# Patient Record
Sex: FEMALE | URBAN - NONMETROPOLITAN AREA
[De-identification: names, ages, dates, MRNs, and addresses within clinical notes are randomized per-mention and may not be internally consistent; named-entity substitution may affect disease eponyms.]

---

## 2020-09-02 ENCOUNTER — APPOINTMENT (RX ONLY)
Dept: URBAN - NONMETROPOLITAN AREA CLINIC 13 | Facility: CLINIC | Age: 70
Setting detail: DERMATOLOGY
End: 2020-09-02

## 2020-09-02 DIAGNOSIS — Z41.9 ENCOUNTER FOR PROCEDURE FOR PURPOSES OTHER THAN REMEDYING HEALTH STATE, UNSPECIFIED: ICD-10-CM

## 2020-09-02 PROCEDURE — ? JUVEDERM VOLUMA XC INJECTION

## 2020-09-02 PROCEDURE — ? RESTYLANE KYSSE INJECTION

## 2020-09-02 NOTE — PROCEDURE: JUVEDERM VOLUMA XC INJECTION
Additional Area 1 Volume In Cc: 0
Anesthesia Volume In Cc: 0.5
Post-Care Instructions: Patient instructed to apply ice to reduce swelling.
Use Map Statement For Sites (Optional): No
Number Of Syringes (Required For Inventory): 1
Additional Area 2 Location: jawline
Cheeks Filler Volume In Cc: 3
Lot #: [id number]
Map Statment: See Attach Map for Complete Details
Price (Use Numbers Only, No Special Characters Or $): 5572
Filler: Juvederm Voluma XC
Procedural Text: The filler was administered to the treatment areas noted above. Injected into cheek area.\\n\\nTouch up to slight uneven cheeks.
Additional Area 1 Location: lateral mid face lift
Additional Area 3 Location: Left cheek touch up
Detail Level: Detailed
Expiration Date (Month Year): 8/21
Consent: Written consent obtained. Risks include but not limited to bruising, beading, irregular texture, ulceration, infection, allergic reaction, scar formation, incomplete augmentation, temporary nature, procedural pain.

## 2020-09-02 NOTE — PROCEDURE: RESTYLANE KYSSE INJECTION
Cheeks Filler Volume In Cc: 0
Additional Anesthesia Volume In Cc: 6
Anesthesia Volume In Cc: 0.5
Map Statement: See Attach Map for Complete Details
Number Of Syringes (Required For Inventory): 1
Lot #: [id number]
Procedural Text: The filler was administered to the treatment areas noted above.
Price (Use Numbers Only, No Special Characters Or $): 181
Additional Area 2 Location: perioral rhytides
Additional Area 2 Volume In Cc: 0.3
Detail Level: Detailed
Consent: Written consent obtained. Risks include but not limited to bruising, beading, irregular texture, ulceration, infection, allergic reaction, scar formation, incomplete augmentation, temporary nature, procedural pain.
Include Cannula Information In Note?: No
Filler: Restylane Kysse
Anesthesia Type: 1% lidocaine with epinephrine
Expiration Date (Month Year): 5/22
Vermilion Lips Filler Volume In Cc: 0.7
Additional Area 1 Location: upper vermillion, perioral rhytides
Post-Care Instructions: Patient instructed to apply ice to reduce swelling.

## 2020-09-03 ENCOUNTER — APPOINTMENT (RX ONLY)
Dept: URBAN - NONMETROPOLITAN AREA CLINIC 13 | Facility: CLINIC | Age: 70
Setting detail: DERMATOLOGY
End: 2020-09-03

## 2020-09-03 DIAGNOSIS — L81.4 OTHER MELANIN HYPERPIGMENTATION: ICD-10-CM

## 2020-09-03 PROCEDURE — ? Q-SWITCHED LASER

## 2020-09-03 ASSESSMENT — LOCATION SIMPLE DESCRIPTION DERM
LOCATION SIMPLE: LEFT FOREHEAD
LOCATION SIMPLE: RIGHT CHEEK
LOCATION SIMPLE: LEFT CHEEK

## 2020-09-03 ASSESSMENT — LOCATION ZONE DERM: LOCATION ZONE: FACE

## 2020-09-03 ASSESSMENT — LOCATION DETAILED DESCRIPTION DERM
LOCATION DETAILED: RIGHT CENTRAL MALAR CHEEK
LOCATION DETAILED: LEFT INFERIOR MEDIAL FOREHEAD
LOCATION DETAILED: LEFT LATERAL MALAR CHEEK

## 2020-09-03 NOTE — PROCEDURE: Q-SWITCHED LASER
Treatment Number: 1
Area In Cm^2: 0
Fluence: 0.6
Spot Size In Mm: 8
Endpoint: Immediate endpoint erythema. Post care reviewed with patient.
Spot Size In Mm: 2
Number Of Pulses: 2 passes
Consent: Written consent obtained, risks reviewed including but not limited to crusting, scabbing, blistering, scarring, darker or lighter pigmentary change, systemic reactions, ulceration, incidental hair removal, bruising, and/or incomplete removal.
Fluence: 7.5
Fluence: 1.6
Frequency In Hz: 5
Detail Level: Detailed
Post-Care Instructions: I reviewed with the patient in detail post-care instructions. Patient should avoid sunlight and wear sun protection.
Spot Size In Mm: 3
Frequency In Hz: 10
Price (Use Numbers Only, No Special Characters Or $): 400
Anesthesia Type: 1% lidocaine with epinephrine
Fluence: 4.5

## 2021-06-30 ENCOUNTER — APPOINTMENT (RX ONLY)
Dept: URBAN - NONMETROPOLITAN AREA CLINIC 13 | Facility: CLINIC | Age: 71
Setting detail: DERMATOLOGY
End: 2021-06-30

## 2021-06-30 DIAGNOSIS — Z41.9 ENCOUNTER FOR PROCEDURE FOR PURPOSES OTHER THAN REMEDYING HEALTH STATE, UNSPECIFIED: ICD-10-CM

## 2021-06-30 PROCEDURE — ? JUVEDERM VOLUMA XC INJECTION

## 2021-06-30 PROCEDURE — ? BOTOX

## 2021-06-30 PROCEDURE — ? RESTYLANE KYSSE INJECTION

## 2021-06-30 NOTE — PROCEDURE: BOTOX
Additional Area 2 Units: 0
Additional Area 1 Location: brow 3u R 2u L
Price (Use Numbers Only, No Special Characters Or $): 931
Dilution (U/0.1 Cc): 1
Show Right And Left Pupillary Line Units: No
Additional Area 6 Location: R side of forehead
Glabellar Complex Units: 25
Show Periorbital Units: Yes
Additional Area 3 Location: Right lateral eye
Detail Level: Zone
Additional Area 5 Location: forhead R side 1 unit.
Inferior Lateral Orbicularis Oculi Units: 15
Consent: Written consent obtained. Risks include but not limited to lid/brow ptosis, bruising, swelling, diplopia, temporary effect, incomplete chemical denervation.
Additional Area 2 Location: upper lip
Expiration Date (Month Year): 10/23
Lot #: 
Post-Care Instructions: Patient instructed to not lie down for 4 hours and limit physical activity for 24 hours. Patient instructed not to travel by airplane for 48 hours.
Additional Area 4 Location: under eyes
Forehead Units: 5

## 2021-06-30 NOTE — PROCEDURE: RESTYLANE KYSSE INJECTION
Dorsal Hands Filler Volume In Cc: 0
Anesthesia Type: 1% lidocaine with epinephrine
Expiration Date (Month Year): 8/22
Post-Care Instructions: Patient instructed to apply ice to reduce swelling.
Price (Use Numbers Only, No Special Characters Or $): 234
Additional Anesthesia Volume In Cc: 6
Additional Area 1 Location: vermillion, perioral rhytides
Map Statement: See Attach Map for Complete Details
Number Of Syringes (Required For Inventory): 1
Marionette Lines Filler Volume In Cc: 0.3
Detail Level: Detailed
Anesthesia Volume In Cc: 0.5
Use Map Statement For Sites (Optional): No
Procedural Text: The filler was administered to the treatment areas noted above.
Lot #: [id number]
Additional Area 2 Location: perioral rhytides, corners of mouth
Consent: Written consent obtained. Risks include but not limited to bruising, beading, irregular texture, ulceration, infection, allergic reaction, scar formation, incomplete augmentation, temporary nature, procedural pain.
Vermilion Lips Filler Volume In Cc: 0.7
Filler: Restylane Kysse

## 2021-06-30 NOTE — PROCEDURE: JUVEDERM VOLUMA XC INJECTION
Additional Area 3 Volume In Cc: 0
Use Map Statement For Sites (Optional): No
Cheeks Filler Volume In Cc: 2
Include Cannula Length?: 1 inch
Lot #: [id number]
Additional Area 2 Location: chin
Additional Area 4 Location: lower face rhytides
Filler: Juvederm Voluma XC
Procedural Text: The filler was administered to the treatment areas noted above. .\\n.
Mid Face Filler Volume In Cc: 1
Consent: Written consent obtained. Risks include but not limited to bruising, beading, irregular texture, ulceration, infection, allergic reaction, scar formation, incomplete augmentation, temporary nature, procedural pain.
Map Statment: See Attach Map for Complete Details
Include Cannula Size?: 25G
Price (Use Numbers Only, No Special Characters Or $): 7060
Expiration Date (Month Year): 7/22
Include Cannula Information In Note?: Yes
Additional Area 3 Location: Left cheek touch up
Additional Area 1 Location: lateral jawline
Post-Care Instructions: Patient instructed to apply ice to reduce swelling.
Detail Level: Detailed
Anesthesia Volume In Cc: 0.5

## 2021-07-08 ENCOUNTER — APPOINTMENT (RX ONLY)
Dept: URBAN - NONMETROPOLITAN AREA CLINIC 13 | Facility: CLINIC | Age: 71
Setting detail: DERMATOLOGY
End: 2021-07-08

## 2021-07-08 DIAGNOSIS — Z41.9 ENCOUNTER FOR PROCEDURE FOR PURPOSES OTHER THAN REMEDYING HEALTH STATE, UNSPECIFIED: ICD-10-CM

## 2021-07-08 PROCEDURE — ? RESTYLANE DEFYNE INJECTION

## 2021-07-08 PROCEDURE — ? RESTYLANE LYFT INJECTION

## 2021-07-08 NOTE — PROCEDURE: RESTYLANE LYFT INJECTION
Lateral Face Filler Volume In Cc: 0
Expiration Date (Month Year): 12/23
Post-Care Instructions: Patient instructed to apply ice to reduce swelling.
Use Map Statement For Sites (Optional): No
Number Of Syringes (Required For Inventory): 1
Anesthesia Volume In Cc: 0.5
Additional Area 1 Location: top of hands
Include Cannula Length?: 1.5 inch
Additional Anesthesia Volume In Cc: 6
Lot #: [id number]
Price (Use Numbers Only, No Special Characters Or $): 069
Map Statement: See Attach Map for Complete Details
Procedural Text: The filler was administered to the treatment areas noted above.
Detail Level: Detailed
Anesthesia Type: 1% lidocaine with epinephrine
Filler: Power Harden
Include Cannula Information In Note?: Yes
Consent: Written consent obtained. Risks include but not limited to bruising, beading, irregular texture, ulceration, infection, allergic reaction, scar formation, incomplete augmentation, temporary nature, procedural pain.
Include Cannula Size?: 25G

## 2021-07-08 NOTE — PROCEDURE: RESTYLANE DEFYNE INJECTION
Additional Area 2 Location: lower face, chin area
Decollete Filler Volume In Cc: 0
Procedural Text: The filler was administered to the treatment areas noted above.
Lot #: [id number]
Consent: Written consent obtained. Risks include but not limited to bruising, beading, irregular texture, ulceration, infection, allergic reaction, scar formation, incomplete augmentation, temporary nature, procedural pain.
Anesthesia Type: 1% lidocaine with epinephrine
Include Cannula Information In Note?: No
Filler: Restylane Defyne
Additional Anesthesia Volume In Cc: 6
Additional Area 1 Location: chin
Post-Care Instructions: Patient instructed to apply ice to reduce swelling.
Expiration Date (Month Year): 8/22
Price (Use Numbers Only, No Special Characters Or $): 206
Map Statement: See Attach Map for Complete Details
Anesthesia Volume In Cc: 0.5
Marionette Lines Filler Volume In Cc: 1
Detail Level: Detailed

## 2021-11-04 ENCOUNTER — APPOINTMENT (RX ONLY)
Dept: URBAN - NONMETROPOLITAN AREA CLINIC 13 | Facility: CLINIC | Age: 71
Setting detail: DERMATOLOGY
End: 2021-11-04

## 2021-11-04 DIAGNOSIS — Z41.9 ENCOUNTER FOR PROCEDURE FOR PURPOSES OTHER THAN REMEDYING HEALTH STATE, UNSPECIFIED: ICD-10-CM

## 2021-11-04 PROCEDURE — ? BOTOX

## 2021-11-04 NOTE — PROCEDURE: BOTOX
Show Mentalis Units: No
[facility] Units: 0
Show Nasal Units: Yes
Additional Area 1 Location: brow,
Consent: Written consent obtained. Risks include but not limited to lid/brow ptosis, bruising, swelling, diplopia, temporary effect, incomplete chemical denervation.
Additional Area 3 Location: left lateral eye 3 Right 4
Glabellar Complex Units: 25
Additional Area 5 Location: forhead R side 1 unit.
Detail Level: Zone
Expiration Date (Month Year): 10/23
Inferior Lateral Orbicularis Oculi Units: 20
Lot #: 
Post-Care Instructions: Patient instructed to not lie down for 4 hours and limit physical activity for 24 hours. Patient instructed not to travel by airplane for 48 hours.
Additional Area 2 Location: upper lip
Price (Use Numbers Only, No Special Characters Or $): 250
Additional Area 4 Location: corners of nose for gummy smile
Dilution (U/0.1 Cc): 1
Forehead Units: 5
Additional Area 6 Location: chin

## 2022-02-24 ENCOUNTER — APPOINTMENT (RX ONLY)
Dept: URBAN - NONMETROPOLITAN AREA CLINIC 13 | Facility: CLINIC | Age: 72
Setting detail: DERMATOLOGY
End: 2022-02-24

## 2022-02-24 DIAGNOSIS — Z41.9 ENCOUNTER FOR PROCEDURE FOR PURPOSES OTHER THAN REMEDYING HEALTH STATE, UNSPECIFIED: ICD-10-CM

## 2022-02-24 PROCEDURE — ? BOTOX

## 2022-02-24 PROCEDURE — ? RESTYLANE LYFT INJECTION

## 2022-02-24 NOTE — PROCEDURE: BOTOX
Show Additional Area 6: Yes
Additional Area 1 Units: 0
Detail Level: Zone
Additional Area 5 Location: forhead R side 1 unit.
Inferior Lateral Orbicularis Oculi Units: 20
Show Mentalis Units: No
Additional Area 2 Location: upper lip
Post-Care Instructions: Patient instructed to not lie down for 4 hours and limit physical activity for 24 hours. Patient instructed not to travel by airplane for 48 hours.
Expiration Date (Month Year): 4/24
Additional Area 6 Location: chin
Additional Area 4 Location: under lower lash line
Dilution (U/0.1 Cc): 1
Forehead Units: 5
Consent: Written consent obtained. Risks include but not limited to lid/brow ptosis, bruising, swelling, diplopia, temporary effect, incomplete chemical denervation.
Additional Area 1 Location: brow
Lot #: [id number]
Additional Area 3 Location: left lateral eye 3 Right 4
Glabellar Complex Units: 25
Price (Use Numbers Only, No Special Characters Or $): 353

## 2022-02-24 NOTE — PROCEDURE: RESTYLANE LYFT INJECTION
Anesthesia Type: 1% lidocaine with epinephrine
Post-Care Instructions: Patient instructed to apply ice to reduce swelling.
Dorsal Hands Filler Volume In Cc: 0
Lateral Face Filler Volume In Cc: 2
Expiration Date (Month Year): 7/24
Additional Anesthesia Volume In Cc: 6
Additional Area 1 Location: temples and lateral upper face
Include Cannula Length?: 1.5 inch
Detail Level: Detailed
Use Map Statement For Sites (Optional): No
Number Of Syringes (Required For Inventory): 1
Anesthesia Volume In Cc: 0.5
Procedural Text: The filler was administered to the treatment areas noted above.
Lot #: [id number]
Consent: Written consent obtained. Risks include but not limited to bruising, beading, irregular texture, ulceration, infection, allergic reaction, scar formation, incomplete augmentation, temporary nature, procedural pain.
Include Cannula Size?: 25G
Include Cannula Information In Note?: Yes
Map Statement: See Attach Map for Complete Details
Filler: Power Harden
Price (Use Numbers Only, No Special Characters Or $): 6230

## 2022-09-28 ENCOUNTER — APPOINTMENT (RX ONLY)
Dept: URBAN - NONMETROPOLITAN AREA CLINIC 13 | Facility: CLINIC | Age: 72
Setting detail: DERMATOLOGY
End: 2022-09-28

## 2022-09-28 DIAGNOSIS — Z41.9 ENCOUNTER FOR PROCEDURE FOR PURPOSES OTHER THAN REMEDYING HEALTH STATE, UNSPECIFIED: ICD-10-CM

## 2022-09-28 PROCEDURE — ? BOTOX

## 2022-09-28 NOTE — PROCEDURE: BOTOX
Inferior Lateral Orbicularis Oculi Units: 20
Show Additional Area 2: Yes
Show Ucl Units: No
Post-Care Instructions: Patient instructed to not lie down for 4 hours and limit physical activity for 24 hours. Patient instructed not to travel by airplane for 48 hours.
Right Pupillary Line Units: 0
Additional Area 3 Location: chin
Detail Level: Zone
Additional Area 6 Location: jelly rolls
Additional Area 4 Location: under lower lash lines R
Expiration Date (Month Year): 11/24
Lot #: 
Price (Use Numbers Only, No Special Characters Or $): 971
Additional Area 5 Location: corner of nose/upper lip area
Additional Area 2 Location: lip
Dilution (U/0.1 Cc): 1
Consent: Written consent obtained. Risks include but not limited to lid/brow ptosis, bruising, swelling, diplopia, temporary effect, incomplete chemical denervation.
Forehead Units: 8
Glabellar Complex Units: 25
Additional Area 1 Location: brow

## 2024-07-17 ENCOUNTER — APPOINTMENT (RX ONLY)
Dept: URBAN - NONMETROPOLITAN AREA CLINIC 13 | Facility: CLINIC | Age: 74
Setting detail: DERMATOLOGY
End: 2024-07-17

## 2024-07-17 DIAGNOSIS — Z41.9 ENCOUNTER FOR PROCEDURE FOR PURPOSES OTHER THAN REMEDYING HEALTH STATE, UNSPECIFIED: ICD-10-CM

## 2024-07-17 PROCEDURE — ? MEDICAL CONSULTATION: FILLERS

## 2024-07-17 PROCEDURE — ? RESTYLANE LYFT INJECTION

## 2024-07-17 NOTE — PROCEDURE: RESTYLANE LYFT INJECTION
Post-Care Instructions: Patient instructed to apply ice to reduce swelling. Call office with any concerns.
Number Of Syringes (Required For Inventory): 1
Additional Area 5 Volume In Cc: 0
Include Cannula Length?: 1 inch
Map Statement: See Attach Map for Complete Details
Price (Use Numbers Only, No Special Characters Or $): 7948
Procedural Text: The filler was administered to the treatment areas noted above. Cannula was used in lateral face.
Cheeks Filler Volume In Cc: 2
Additional Area 4 Location: jaw line, chin
Additional Area 2 Location: top of hands
Lot #: [id number]
Anesthesia Type: 1% lidocaine with epinephrine
Consent: Written consent obtained. Risks include but not limited to bruising, beading, irregular texture, ulceration, infection, allergic reaction, scar formation, incomplete augmentation, temporary nature, procedural pain. Patient asked to call with any concerns. Follow up if needed.
Filler: Power Harden
Detail Level: Detailed
Include Cannula Information In Note?: Yes
Use Map Statement For Sites (Optional): No
Include Cannula Size?: 25G
Additional Area 1 Location: temples and lateral upper face
Expiration Date (Month Year): 4/26